# Patient Record
Sex: FEMALE | URBAN - METROPOLITAN AREA
[De-identification: names, ages, dates, MRNs, and addresses within clinical notes are randomized per-mention and may not be internally consistent; named-entity substitution may affect disease eponyms.]

---

## 2022-02-02 ENCOUNTER — OFFICE VISIT (OUTPATIENT)
Dept: URBAN - METROPOLITAN AREA CLINIC 30 | Facility: CLINIC | Age: 27
End: 2022-02-02
Payer: COMMERCIAL

## 2022-02-02 DIAGNOSIS — R51.9 HEADACHE: ICD-10-CM

## 2022-02-02 DIAGNOSIS — H52.223 REGULAR ASTIGMATISM, BILATERAL: Primary | ICD-10-CM

## 2022-02-02 PROCEDURE — 92025 CPTRIZED CORNEAL TOPOGRAPHY: CPT | Performed by: OPTOMETRIST

## 2022-02-02 PROCEDURE — 92004 COMPRE OPH EXAM NEW PT 1/>: CPT | Performed by: OPTOMETRIST

## 2022-02-02 PROCEDURE — 92134 CPTRZ OPH DX IMG PST SGM RTA: CPT | Performed by: OPTOMETRIST

## 2022-02-02 ASSESSMENT — INTRAOCULAR PRESSURE
OS: 15
OD: 17

## 2022-02-02 ASSESSMENT — KERATOMETRY
OD: 45.10
OS: 45.67

## 2022-02-02 ASSESSMENT — VISUAL ACUITY
OD: 20/25
OS: 20/30

## 2022-02-02 NOTE — IMPRESSION/PLAN
Impression: Regular astigmatism, bilateral: H52.223. Plan: High cyl OS>OD. Pt notes she tried CLs in past and couldn't insert. Not currently wearing glasses (may contribute to headaches). Generated spec rx. Topography today-generally regular astigmatism. Denies FHx of keratoconus. Pt interested in surgical options, rec see Dr. Yamileth Lujan at [facility], discussed LASIK for OD and possibly toric ICL/LASIK for OS.

## 2022-02-02 NOTE — IMPRESSION/PLAN
Impression: Headache: R51.9. Plan: Chronic, pt educ, no optic disc edema or pallor. MAC OCT WNL OU. Continue care c PCP.

## 2022-03-02 ENCOUNTER — REFRACTIVE (OUTPATIENT)
Dept: URBAN - METROPOLITAN AREA CLINIC 37 | Facility: CLINIC | Age: 27
End: 2022-03-02

## 2022-03-02 DIAGNOSIS — H18.603 KERATOCONUS, UNSPECIFIED, BILATERAL: Primary | ICD-10-CM

## 2022-03-02 ASSESSMENT — KERATOMETRY
OS: 45.50
OD: 45.10